# Patient Record
Sex: FEMALE | Race: BLACK OR AFRICAN AMERICAN | NOT HISPANIC OR LATINO | Employment: PART TIME | URBAN - METROPOLITAN AREA
[De-identification: names, ages, dates, MRNs, and addresses within clinical notes are randomized per-mention and may not be internally consistent; named-entity substitution may affect disease eponyms.]

---

## 2019-10-21 ENCOUNTER — HOSPITAL ENCOUNTER (EMERGENCY)
Facility: HOSPITAL | Age: 24
Discharge: HOME | End: 2019-10-21
Attending: EMERGENCY MEDICINE | Admitting: EMERGENCY MEDICINE

## 2019-10-21 VITALS
OXYGEN SATURATION: 95 % | RESPIRATION RATE: 16 BRPM | DIASTOLIC BLOOD PRESSURE: 96 MMHG | SYSTOLIC BLOOD PRESSURE: 114 MMHG | HEIGHT: 63 IN | HEART RATE: 101 BPM | TEMPERATURE: 98.9 F

## 2019-10-21 DIAGNOSIS — N75.0 BARTHOLIN CYST: Primary | ICD-10-CM

## 2019-10-21 DIAGNOSIS — Z72.51 HIGH RISK HETEROSEXUAL BEHAVIOR: ICD-10-CM

## 2019-10-21 LAB
B-HCG UR QL: NEGATIVE
CLUE CELLS SPEC QL WET PREP: NORMAL
HYDATID CYST SPEC WET PREP: NORMAL
T VAGINALIS SPEC QL WET PREP: NORMAL
WBC SPEC QL WET PREP: NORMAL
YEAST GENITAL QL WET PREP: NORMAL

## 2019-10-21 PROCEDURE — 87210 SMEAR WET MOUNT SALINE/INK: CPT | Performed by: NURSE PRACTITIONER

## 2019-10-21 PROCEDURE — 99283 EMERGENCY DEPT VISIT LOW MDM: CPT

## 2019-10-21 PROCEDURE — 87070 CULTURE OTHR SPECIMN AEROBIC: CPT | Performed by: EMERGENCY MEDICINE

## 2019-10-21 PROCEDURE — 87205 SMEAR GRAM STAIN: CPT | Performed by: EMERGENCY MEDICINE

## 2019-10-21 PROCEDURE — 87491 CHLMYD TRACH DNA AMP PROBE: CPT | Performed by: NURSE PRACTITIONER

## 2019-10-21 PROCEDURE — 25010000002 CEFTRIAXONE PER 250 MG: Performed by: NURSE PRACTITIONER

## 2019-10-21 PROCEDURE — 87591 N.GONORRHOEAE DNA AMP PROB: CPT | Performed by: NURSE PRACTITIONER

## 2019-10-21 PROCEDURE — 96372 THER/PROPH/DIAG INJ SC/IM: CPT

## 2019-10-21 PROCEDURE — 81025 URINE PREGNANCY TEST: CPT | Performed by: NURSE PRACTITIONER

## 2019-10-21 RX ORDER — CEPHALEXIN 500 MG/1
500 CAPSULE ORAL 3 TIMES DAILY
Qty: 40 CAPSULE | Refills: 0 | OUTPATIENT
Start: 2019-10-21 | End: 2019-12-04

## 2019-10-21 RX ORDER — LIDOCAINE HYDROCHLORIDE 10 MG/ML
0.9 INJECTION, SOLUTION EPIDURAL; INFILTRATION; INTRACAUDAL; PERINEURAL ONCE
Status: COMPLETED | OUTPATIENT
Start: 2019-10-21 | End: 2019-10-21

## 2019-10-21 RX ORDER — CEFTRIAXONE SODIUM 250 MG/1
250 INJECTION, POWDER, FOR SOLUTION INTRAMUSCULAR; INTRAVENOUS ONCE
Status: COMPLETED | OUTPATIENT
Start: 2019-10-21 | End: 2019-10-21

## 2019-10-21 RX ORDER — HYDROCODONE BITARTRATE AND ACETAMINOPHEN 5; 325 MG/1; MG/1
1 TABLET ORAL ONCE
Status: COMPLETED | OUTPATIENT
Start: 2019-10-21 | End: 2019-10-21

## 2019-10-21 RX ORDER — ONDANSETRON 4 MG/1
4 TABLET, ORALLY DISINTEGRATING ORAL ONCE
Status: COMPLETED | OUTPATIENT
Start: 2019-10-21 | End: 2019-10-21

## 2019-10-21 RX ADMIN — CEFTRIAXONE SODIUM 250 MG: 250 INJECTION, POWDER, FOR SOLUTION INTRAMUSCULAR; INTRAVENOUS at 17:29

## 2019-10-21 RX ADMIN — ONDANSETRON 4 MG: 4 TABLET, ORALLY DISINTEGRATING ORAL at 16:57

## 2019-10-21 RX ADMIN — LIDOCAINE HYDROCHLORIDE 0.9 ML: 10 INJECTION, SOLUTION EPIDURAL; INFILTRATION; INTRACAUDAL; PERINEURAL at 17:29

## 2019-10-21 RX ADMIN — HYDROCODONE BITARTRATE AND ACETAMINOPHEN 1 TABLET: 5; 325 TABLET ORAL at 16:57

## 2019-10-21 NOTE — ED PROVIDER NOTES
MD ATTESTATION NOTE    The DARCIE and I have discussed this patient's history, physical exam, and treatment plan.  I have reviewed the documentation and personally had a face to face interaction with the patient. I affirm the documentation and agree with the treatment and plan.  The attached note describes my personal findings.      History  24-year-old female presents with drainage from the vaginal area.  She is concerned that she may be pregnant.    Physical Exam  Vital Signs reviewed  Alert, Well Appearing in NAD  Abdomen there is no significant tenderness to palpation.    Disposition  I discussed patient's evaluation with NP Vee Yip.  She feels that there is a draining Bartholin cyst.  She will express purulence from this wound but does not feel that it needs surgical drainage at this point.  We will check some testing for STDs and for pregnancy.     Ephraim Garcia MD  10/21/19 8663

## 2019-10-21 NOTE — ED PROVIDER NOTES
"EMERGENCY DEPARTMENT ENCOUNTER    Room Number:  07/07  Date seen:  10/21/2019  Time seen: 4:30 PM  PCP: Provider, No Known    HPI:  Chief complaint:Vaginal   Context:Haley Hutchins is a 24 y.o. female who presents to the ED with c/o intermittent vaginal bleeding that was noticed one week ago. Pt also c/o malodorous vaginal discharge and a vaginal lesion with pain. The pt states she believed to have a yeast infection around 10/4/19 for which she treated with Monistat. Shortly after, she developed a \"lump\" at the vaginal opening with intermittent drainage seen. She noticed abnormal vaginal bleeding described as \"spotting\" one week ago, that has persisted. Due to continuous bleeding and pain, she decided to come to the ED for further evaluation. The pt reports possible STD exposure, as well as the potential to be pregnant, as she has had unprotected vaginal intercourse for the past month.     Onset: gradual  Location:vagina  Radiation: none stated  Duration: one week  Timing: intermittent  Character: \"spotting\"  Aggravating Factors: none stated  Alleviating Factors: none stated  Severity: moderate    ALLERGIES  Shellfish-derived products    PAST MEDICAL HISTORY  Active Ambulatory Problems     Diagnosis Date Noted   • No Active Ambulatory Problems     Resolved Ambulatory Problems     Diagnosis Date Noted   • No Resolved Ambulatory Problems     No Additional Past Medical History       PAST SURGICAL HISTORY  No past surgical history on file.    FAMILY HISTORY  No family history on file.    SOCIAL HISTORY  Social History     Socioeconomic History   • Marital status: Single     Spouse name: Not on file   • Number of children: Not on file   • Years of education: Not on file   • Highest education level: Not on file       REVIEW OF SYSTEMS  Review of Systems   Constitutional: Negative for fatigue and fever.   HENT: Negative for congestion and sore throat.    Eyes: Negative for visual disturbance.   Respiratory: Negative for " cough, shortness of breath and wheezing.    Cardiovascular: Negative for chest pain.   Gastrointestinal: Negative for abdominal pain, diarrhea, nausea and vomiting.   Genitourinary: Positive for genital sores (lesion), vaginal bleeding, vaginal discharge and vaginal pain. Negative for dysuria, frequency and urgency.   Musculoskeletal: Negative for arthralgias, back pain and myalgias.   Skin: Negative for rash.   Neurological: Negative for dizziness, syncope, weakness and headaches.   Psychiatric/Behavioral: Negative for confusion and self-injury. The patient is not nervous/anxious.        PHYSICAL EXAM  ED Triage Vitals [10/21/19 1607]   Temp Heart Rate Resp BP SpO2   98.9 °F (37.2 °C) 101 16 -- 95 %      Temp src Heart Rate Source Patient Position BP Location FiO2 (%)   Tympanic Monitor -- -- --     Physical Exam   Constitutional: She is oriented to person, place, and time and well-developed, well-nourished, and in no distress.   HENT:   Head: Normocephalic and atraumatic.   Nose: Nose normal.   Mouth/Throat: Uvula is midline, oropharynx is clear and moist and mucous membranes are normal.   Eyes: Pupils are equal, round, and reactive to light.   Neck: Normal range of motion. Neck supple.   Cardiovascular: Normal rate, regular rhythm and normal heart sounds.   Pulmonary/Chest: Effort normal and breath sounds normal.   Abdominal: Soft. Normal appearance and bowel sounds are normal. There is no tenderness.   Genitourinary: Vagina exhibits lesion (draining to the L labia near the introitus that is tender without enduration). Vagina exhibits no exudate. Yellow-green and vaginal discharge (purulent) found.   Neurological: She is alert and oriented to person, place, and time. GCS score is 15.   Skin: Skin is warm, dry and intact.   Psychiatric: Mood, memory, affect and judgment normal.   Nursing note and vitals reviewed.      LAB RESULTS  Recent Results (from the past 24 hour(s))   Wet Prep, Genital - Swab, Vagina     Collection Time: 10/21/19  4:39 PM   Result Value Ref Range    YEAST No yeast seen No yeast seen    HYPHAL ELEMENTS No Hyphal elements seen No Hyphal elements seen    WBC'S No WBC's seen No WBC's seen    Clue Cells, Wet Prep No Clue cells seen No Clue cells seen    Trichomonas, Wet Prep No Trichomonas seen No Trichomonas seen   Wound Culture - Wound, Vagina    Collection Time: 10/21/19  4:40 PM   Result Value Ref Range    Gram Stain No WBCs seen     Gram Stain Few (2+) Gram positive cocci in pairs     Gram Stain Rare (1+) Gram positive bacilli     Gram Stain Few (2+) Gram negative bacilli    Pregnancy, Urine - Urine, Clean Catch    Collection Time: 10/21/19  4:57 PM   Result Value Ref Range    HCG, Urine QL Negative Negative       I ordered the above labs and reviewed the results      MEDICATIONS GIVEN IN ER  Medications   HYDROcodone-acetaminophen (NORCO) 5-325 MG per tablet 1 tablet (1 tablet Oral Given 10/21/19 1657)   ondansetron ODT (ZOFRAN-ODT) disintegrating tablet 4 mg (4 mg Oral Given 10/21/19 1657)   cefTRIAXone (ROCEPHIN) injection 250 mg (250 mg Intramuscular Given 10/21/19 1729)   lidocaine PF 1% (XYLOCAINE) injection 0.9 mL (0.9 mL Injection Given 10/21/19 1729)       PROGRESS AND CONSULTS    Progress Notes:       1632 Pregnancy urine ordered. Wet prep ordered. Chlamydia trachomatis ordered.     1638 Norco ordered. Zofran ordered.     1640 Wound culture ordered.     1657 Reviewed pt's history and workup with Dr. Garcia.  After a bedside evaluation; Dr Garcia agrees with the plan of care    1710 Rechecked pt. Pt is resting comfortably with BP of 114/96. Discussed w/pt results show no yeast infection or bacterial vaginosis. Pt will be updated with additional labs when resulted. Plan is to d/c pt with f/u to OB/GYN as needed. Pt was instructed to take warm baths and return to the ED with worsening sx's. Pt understands and agrees with the plan. All questions were answered.     1713 Rocephin ordered.  "Xylocaine ordered.     Disposition vitals:  /96   Pulse 101   Temp 98.9 °F (37.2 °C) (Tympanic)   Resp 16   Ht 160 cm (63\")   LMP 10/10/2019 (Exact Date)   SpO2 95%       DIAGNOSIS  Final diagnoses:   Bartholin cyst   High risk heterosexual behavior       FOLLOW UP   PATIENT LIAISON [place] [address]  [phone]  Call       Edwar Aguiar MD  [address] [facility] [address]  [place] [address]  [phone]    Call         RX     Medication List      No changes were made to your prescriptions during this visit.       Documentation assistance provided by shahla Yanez for KIRSTIN Smith.  Information recorded by the scribe was done at my direction and has been verified and validated by me.       Beau Hauser  10/21/19 1806       Vee Yip APRN  10/21/19 2007    "

## 2019-10-21 NOTE — DISCHARGE INSTRUCTIONS
Home to rest  Warm sitz baths as discussed   Warm washcloth to the area 4 times per day 20 min at a time to promote drainage  If continue pain, follow up with gynecology Dr Aguiar  You were treated for a possible gonorrhea infection today. The results are not available. They will be available in 24-48 hours. If +, your partner will also need treatment. You cannot have sex until after he has also been treated to prevent re exposing yourself to the infection and causing another infection.   Return if worse or new concerns   Continue care with your primary care physician and have your blood pressure regularly checked and managed. Normal blood pressure is 120/80.

## 2019-10-23 LAB
BACTERIA SPEC AEROBE CULT: NORMAL
C TRACH RRNA SPEC DONR QL NAA+PROBE: NEGATIVE
GRAM STN SPEC: NORMAL
N GONORRHOEA DNA SPEC QL NAA+PROBE: NEGATIVE

## 2019-12-04 ENCOUNTER — APPOINTMENT (OUTPATIENT)
Dept: ULTRASOUND IMAGING | Facility: HOSPITAL | Age: 24
End: 2019-12-04

## 2019-12-04 ENCOUNTER — HOSPITAL ENCOUNTER (EMERGENCY)
Facility: HOSPITAL | Age: 24
Discharge: HOME | End: 2019-12-04
Attending: EMERGENCY MEDICINE | Admitting: EMERGENCY MEDICINE

## 2019-12-04 VITALS
RESPIRATION RATE: 16 BRPM | BODY MASS INDEX: 51.03 KG/M2 | DIASTOLIC BLOOD PRESSURE: 73 MMHG | WEIGHT: 288 LBS | SYSTOLIC BLOOD PRESSURE: 137 MMHG | OXYGEN SATURATION: 98 % | HEART RATE: 77 BPM | HEIGHT: 63 IN | TEMPERATURE: 97.7 F

## 2019-12-04 DIAGNOSIS — N93.9 VAGINAL BLEEDING: Primary | ICD-10-CM

## 2019-12-04 DIAGNOSIS — O20.0 THREATENED MISCARRIAGE: ICD-10-CM

## 2019-12-04 LAB
ABO GROUP BLD: NORMAL
ANION GAP SERPL CALCULATED.3IONS-SCNC: 11.5 MMOL/L (ref 5–15)
BACTERIA UR QL AUTO: ABNORMAL /HPF
BACTERIA UR QL AUTO: ABNORMAL /HPF
BASOPHILS # BLD AUTO: 0.04 10*3/MM3 (ref 0–0.2)
BASOPHILS NFR BLD AUTO: 0.3 % (ref 0–1.5)
BILIRUB UR QL STRIP: NEGATIVE
BILIRUB UR QL STRIP: NEGATIVE
BUN BLD-MCNC: 9 MG/DL (ref 6–20)
BUN/CREAT SERPL: 15.3 (ref 7–25)
CALCIUM SPEC-SCNC: 8.8 MG/DL (ref 8.6–10.5)
CHLORIDE SERPL-SCNC: 104 MMOL/L (ref 98–107)
CLARITY UR: ABNORMAL
CLARITY UR: CLEAR
CO2 SERPL-SCNC: 26.5 MMOL/L (ref 22–29)
COLOR UR: ABNORMAL
COLOR UR: YELLOW
CREAT BLD-MCNC: 0.59 MG/DL (ref 0.57–1)
DEPRECATED RDW RBC AUTO: 43.6 FL (ref 37–54)
EOSINOPHIL # BLD AUTO: 0.18 10*3/MM3 (ref 0–0.4)
EOSINOPHIL NFR BLD AUTO: 1.5 % (ref 0.3–6.2)
ERYTHROCYTE [DISTWIDTH] IN BLOOD BY AUTOMATED COUNT: 14.4 % (ref 12.3–15.4)
GFR SERPL CREATININE-BSD FRML MDRD: >150 ML/MIN/1.73
GLUCOSE BLD-MCNC: 84 MG/DL (ref 65–99)
GLUCOSE UR STRIP-MCNC: NEGATIVE MG/DL
GLUCOSE UR STRIP-MCNC: NEGATIVE MG/DL
HCG INTACT+B SERPL-ACNC: 88.1 MIU/ML
HCT VFR BLD AUTO: 38 % (ref 34–46.6)
HGB BLD-MCNC: 12.7 G/DL (ref 12–15.9)
HGB UR QL STRIP.AUTO: ABNORMAL
HGB UR QL STRIP.AUTO: ABNORMAL
HYALINE CASTS UR QL AUTO: ABNORMAL /LPF
HYALINE CASTS UR QL AUTO: ABNORMAL /LPF
IMM GRANULOCYTES # BLD AUTO: 0.07 10*3/MM3 (ref 0–0.05)
IMM GRANULOCYTES NFR BLD AUTO: 0.6 % (ref 0–0.5)
KETONES UR QL STRIP: NEGATIVE
KETONES UR QL STRIP: NEGATIVE
LEUKOCYTE ESTERASE UR QL STRIP.AUTO: ABNORMAL
LEUKOCYTE ESTERASE UR QL STRIP.AUTO: NEGATIVE
LYMPHOCYTES # BLD AUTO: 4.09 10*3/MM3 (ref 0.7–3.1)
LYMPHOCYTES NFR BLD AUTO: 33 % (ref 19.6–45.3)
MCH RBC QN AUTO: 27.9 PG (ref 26.6–33)
MCHC RBC AUTO-ENTMCNC: 33.4 G/DL (ref 31.5–35.7)
MCV RBC AUTO: 83.3 FL (ref 79–97)
MONOCYTES # BLD AUTO: 0.7 10*3/MM3 (ref 0.1–0.9)
MONOCYTES NFR BLD AUTO: 5.6 % (ref 5–12)
NEUTROPHILS # BLD AUTO: 7.32 10*3/MM3 (ref 1.7–7)
NEUTROPHILS NFR BLD AUTO: 59 % (ref 42.7–76)
NITRITE UR QL STRIP: NEGATIVE
NITRITE UR QL STRIP: NEGATIVE
NRBC BLD AUTO-RTO: 0 /100 WBC (ref 0–0.2)
PH UR STRIP.AUTO: 5.5 [PH] (ref 5–8)
PH UR STRIP.AUTO: 7 [PH] (ref 5–8)
PLATELET # BLD AUTO: 415 10*3/MM3 (ref 140–450)
PMV BLD AUTO: 9.4 FL (ref 6–12)
POTASSIUM BLD-SCNC: 3.9 MMOL/L (ref 3.5–5.2)
PROT UR QL STRIP: ABNORMAL
PROT UR QL STRIP: NEGATIVE
RBC # BLD AUTO: 4.56 10*6/MM3 (ref 3.77–5.28)
RBC # UR: ABNORMAL /HPF
RBC # UR: ABNORMAL /HPF
REF LAB TEST METHOD: ABNORMAL
REF LAB TEST METHOD: ABNORMAL
RH BLD: POSITIVE
SODIUM BLD-SCNC: 142 MMOL/L (ref 136–145)
SP GR UR STRIP: 1.01 (ref 1–1.03)
SP GR UR STRIP: 1.01 (ref 1–1.03)
SQUAMOUS #/AREA URNS HPF: ABNORMAL /HPF
SQUAMOUS #/AREA URNS HPF: ABNORMAL /HPF
UROBILINOGEN UR QL STRIP: ABNORMAL
UROBILINOGEN UR QL STRIP: ABNORMAL
WBC NRBC COR # BLD: 12.4 10*3/MM3 (ref 3.4–10.8)
WBC UR QL AUTO: ABNORMAL /HPF
WBC UR QL AUTO: ABNORMAL /HPF

## 2019-12-04 PROCEDURE — 80048 BASIC METABOLIC PNL TOTAL CA: CPT | Performed by: NURSE PRACTITIONER

## 2019-12-04 PROCEDURE — 81001 URINALYSIS AUTO W/SCOPE: CPT | Performed by: NURSE PRACTITIONER

## 2019-12-04 PROCEDURE — P9612 CATHETERIZE FOR URINE SPEC: HCPCS

## 2019-12-04 PROCEDURE — 76817 TRANSVAGINAL US OBSTETRIC: CPT

## 2019-12-04 PROCEDURE — 85025 COMPLETE CBC W/AUTO DIFF WBC: CPT | Performed by: NURSE PRACTITIONER

## 2019-12-04 PROCEDURE — 99284 EMERGENCY DEPT VISIT MOD MDM: CPT

## 2019-12-04 PROCEDURE — 86900 BLOOD TYPING SEROLOGIC ABO: CPT | Performed by: NURSE PRACTITIONER

## 2019-12-04 PROCEDURE — 86901 BLOOD TYPING SEROLOGIC RH(D): CPT | Performed by: NURSE PRACTITIONER

## 2019-12-04 PROCEDURE — 76815 OB US LIMITED FETUS(S): CPT

## 2019-12-04 PROCEDURE — 84702 CHORIONIC GONADOTROPIN TEST: CPT | Performed by: NURSE PRACTITIONER

## 2019-12-04 PROCEDURE — 93976 VASCULAR STUDY: CPT

## 2019-12-04 NOTE — ED PROVIDER NOTES
MD ATTESTATION NOTE    The DARCIE and I have discussed this patient's history, physical exam, and treatment plan.  I have reviewed the documentation and personally had a face to face interaction with the patient. I affirm the documentation and agree with the treatment and plan.  The attached note describes my personal findings.      History  24-year-old female presents with heavy vaginal bleeding.  Her last menstrual period was 11/23.  She had 2+ pregnancy test yesterday at home.    Physical Exam  Vital Signs reviewed  Alert, Well Appearing in NAD  Respirations unlabored  Abdomen soft nontender nondistended    Disposition  We will check routine labs including hCG quantitative.  Will get OB ultrasound if hCG is positive.     Ephraim Garcia MD  12/04/19 0873

## 2019-12-04 NOTE — DISCHARGE INSTRUCTIONS
Vaginal rest  Increase fluids  Follow-up with OB/GYN, call today to schedule an appointment.  You will need a repeat quant in 48 hours  Return to the ER for fever, chills, abdominal pain, worsening bleeding or any new or worsening symptoms

## 2019-12-04 NOTE — ED PROVIDER NOTES
EMERGENCY DEPARTMENT ENCOUNTER    CHIEF COMPLAINT  Chief Complaint: vaginal bleeding  History given by:Pt  History limited by:none  Time Seen: 0754  Room Number: 18/18  PMD: Provider, No Known      HPI:  Pt is a 24 y.o. female who presents with bright red vaginal bleeding that began yesterday. Pt states her vaginal bleeding started out as spotting but worsened and became heavy at 6 am this morning. She denies any clots. She states she took a home pregnancy test at 2 pm yesterday and again at 430 that were positive. Patient denies urinary Sx or abnormal vaginal discharge. Pt is currently sexually active without any contraception. Her LMP was 11/19.   Past Medical History- she denies any significant medical Hx.       PAST MEDICAL HISTORY  Active Ambulatory Problems     Diagnosis Date Noted   • No Active Ambulatory Problems     Resolved Ambulatory Problems     Diagnosis Date Noted   • No Resolved Ambulatory Problems     No Additional Past Medical History       PAST SURGICAL HISTORY  History reviewed. No pertinent surgical history.    FAMILY HISTORY  History reviewed. No pertinent family history.    SOCIAL HISTORY  Social History     Socioeconomic History   • Marital status: Single     Spouse name: Not on file   • Number of children: Not on file   • Years of education: Not on file   • Highest education level: Not on file         ALLERGIES  Shellfish-derived products    REVIEW OF SYSTEMS  Review of Systems   Constitutional: Negative for chills and fever.   HENT: Negative for sore throat.    Respiratory: Negative for shortness of breath.    Cardiovascular: Negative for chest pain.   Gastrointestinal: Negative for nausea and vomiting.   Genitourinary: Positive for vaginal bleeding. Negative for dysuria and vaginal discharge.   Musculoskeletal: Negative for back pain.   Skin: Negative for rash.   Neurological: Negative for dizziness.   Psychiatric/Behavioral: The patient is not nervous/anxious.        PHYSICAL EXAM  ED  Triage Vitals   Temp Heart Rate Resp BP SpO2   12/04/19 0715 12/04/19 0715 12/04/19 0715 12/04/19 0723 12/04/19 0715   97.7 °F (36.5 °C) 99 16 154/68 99 %       Physical Exam   Constitutional: She is well-developed, well-nourished, and in no distress. No distress.   HENT:   Head: Normocephalic.   Mouth/Throat: Mucous membranes are normal.   Eyes: EOM are normal. No scleral icterus.   Neck: Normal range of motion.   Cardiovascular: Normal rate, regular rhythm and normal heart sounds.   No murmur heard.  Pulmonary/Chest: Effort normal and breath sounds normal. No respiratory distress. She has no wheezes. She has no rales.   Abdominal: Soft. Bowel sounds are normal. She exhibits no distension. There is no tenderness.   Genitourinary: Uterus normal, cervix normal, right adnexa normal and left adnexa normal.   Genitourinary Comments: Small amount of blood noted in vaginal vault.  Cervical Os closed   ER Tech at bedside during exam   Musculoskeletal: Normal range of motion.   Neurological: She is alert.   Skin: Skin is warm and dry.   Psychiatric: Mood and affect normal.   Nursing note and vitals reviewed.      LAB RESULTS  Recent Results (from the past 24 hour(s))   Basic Metabolic Panel    Collection Time: 12/04/19  8:29 AM   Result Value Ref Range    Glucose 84 65 - 99 mg/dL    BUN 9 6 - 20 mg/dL    Creatinine 0.59 0.57 - 1.00 mg/dL    Sodium 142 136 - 145 mmol/L    Potassium 3.9 3.5 - 5.2 mmol/L    Chloride 104 98 - 107 mmol/L    CO2 26.5 22.0 - 29.0 mmol/L    Calcium 8.8 8.6 - 10.5 mg/dL    eGFR  African Amer >150 >60 mL/min/1.73    BUN/Creatinine Ratio 15.3 7.0 - 25.0    Anion Gap 11.5 5.0 - 15.0 mmol/L   hCG, Quantitative, Pregnancy    Collection Time: 12/04/19  8:29 AM   Result Value Ref Range    HCG Quantitative 88.10 mIU/mL   ABO / Rh    Collection Time: 12/04/19  8:29 AM   Result Value Ref Range    ABO Type O     RH type Positive    CBC Auto Differential    Collection Time: 12/04/19  8:29 AM   Result Value Ref  Range    WBC 12.40 (H) 3.40 - 10.80 10*3/mm3    RBC 4.56 3.77 - 5.28 10*6/mm3    Hemoglobin 12.7 12.0 - 15.9 g/dL    Hematocrit 38.0 34.0 - 46.6 %    MCV 83.3 79.0 - 97.0 fL    MCH 27.9 26.6 - 33.0 pg    MCHC 33.4 31.5 - 35.7 g/dL    RDW 14.4 12.3 - 15.4 %    RDW-SD 43.6 37.0 - 54.0 fl    MPV 9.4 6.0 - 12.0 fL    Platelets 415 140 - 450 10*3/mm3    Neutrophil % 59.0 42.7 - 76.0 %    Lymphocyte % 33.0 19.6 - 45.3 %    Monocyte % 5.6 5.0 - 12.0 %    Eosinophil % 1.5 0.3 - 6.2 %    Basophil % 0.3 0.0 - 1.5 %    Immature Grans % 0.6 (H) 0.0 - 0.5 %    Neutrophils, Absolute 7.32 (H) 1.70 - 7.00 10*3/mm3    Lymphocytes, Absolute 4.09 (H) 0.70 - 3.10 10*3/mm3    Monocytes, Absolute 0.70 0.10 - 0.90 10*3/mm3    Eosinophils, Absolute 0.18 0.00 - 0.40 10*3/mm3    Basophils, Absolute 0.04 0.00 - 0.20 10*3/mm3    Immature Grans, Absolute 0.07 (H) 0.00 - 0.05 10*3/mm3    nRBC 0.0 0.0 - 0.2 /100 WBC   Urinalysis With Microscopic If Indicated (No Culture) - Urine, Catheter    Collection Time: 12/04/19  8:30 AM   Result Value Ref Range    Color, UA Red (A) Yellow, Straw    Appearance, UA Cloudy (A) Clear    pH, UA 5.5 5.0 - 8.0    Specific Gravity, UA 1.015 1.005 - 1.030    Glucose, UA Negative Negative    Ketones, UA Negative Negative    Bilirubin, UA Negative Negative    Blood, UA Large (3+) (A) Negative    Protein, UA Trace (A) Negative    Leuk Esterase, UA Moderate (2+) (A) Negative    Nitrite, UA Negative Negative    Urobilinogen, UA 0.2 E.U./dL 0.2 - 1.0 E.U./dL   Urinalysis, Microscopic Only - Urine, Catheter    Collection Time: 12/04/19  8:30 AM   Result Value Ref Range    RBC, UA Too Numerous to Count (A) None Seen, 0-2 /HPF    WBC, UA 3-5 (A) None Seen, 0-2 /HPF    Bacteria, UA 1+ (A) None Seen /HPF    Squamous Epithelial Cells, UA 0-2 None Seen, 0-2 /HPF    Hyaline Casts, UA None Seen None Seen /LPF    Methodology Manual Light Microscopy    Urinalysis With Microscopic If Indicated (No Culture) - Urine, Catheter     Collection Time: 12/04/19 11:19 AM   Result Value Ref Range    Color, UA Yellow Yellow, Straw    Appearance, UA Clear Clear    pH, UA 7.0 5.0 - 8.0    Specific Gravity, UA 1.014 1.005 - 1.030    Glucose, UA Negative Negative    Ketones, UA Negative Negative    Bilirubin, UA Negative Negative    Blood, UA Moderate (2+) (A) Negative    Protein, UA Negative Negative    Leuk Esterase, UA Negative Negative    Nitrite, UA Negative Negative    Urobilinogen, UA 0.2 E.U./dL 0.2 - 1.0 E.U./dL   Urinalysis, Microscopic Only - Urine, Catheter    Collection Time: 12/04/19 11:19 AM   Result Value Ref Range    RBC, UA 21-30 (A) None Seen, 0-2 /HPF    WBC, UA 0-2 None Seen, 0-2 /HPF    Bacteria, UA None Seen None Seen /HPF    Squamous Epithelial Cells, UA 0-2 None Seen, 0-2 /HPF    Hyaline Casts, UA None Seen None Seen /LPF    Methodology Automated Microscopy        I ordered the above labs and reviewed the results    RADIOLOGY  US Ob Limited 1 + Fetuses   Final Result   Multiple transabdominal and transvaginal images were obtained. The  uterus and both ovaries are well visualized and appear within normal  limits. No intrauterine gestational sac is identified. There is no free  fluid in the cul-de-sac. Correlation with clinical findings and serial  beta hCG measurements is recommended.   US Ob Transvaginal    (Results Pending)   US Testicular or Ovarian Vascular Limited    (Results Pending)   unremarkable- no sign of pregnancy    I ordered the above noted radiological studies and reviewed the images on the PACS system.          PROGRESS AND CONSULTS       0811 Reviewed pt's history and workup with Dr. Garcia.  At bedside evaluation, they agree with the plan of care.    1143 Cath urine reviewed which shows no WBCs or bacteria.     1144 Rechecked pt. Pt is resting comfortably in NAD. Notified pt of lab work results, including low HCG quant of 88, and US that shows no signs of pregnancy. Discussed the plan to discharge the pt home. I  "instructed the pt to f/u with OBGYN and monitor her quant closely. Discussed vaginal rest and fluids. Pt understands and agrees with the plan, all questions answered.      Reviewed implications of results, diagnosis, meds, responsibility to follow up, warning signs and symptoms of possible worsening, potential complications and reasons to return to ER with patient.  Discussed all results and noted any abnormalities with patient.  Discussed absolute need to recheck abnormalities with OBGYN.    Discussed plan for discharge, as there is no emergent indication for admission.  Pt is agreeable and understands need for follow up and repeat testing.  Pt is aware that discharge does not mean that nothing is wrong but it indicates no emergency is present.  Pt is discharged with instructions to follow up with primary care doctor to have their blood pressure rechecked.       DIAGNOSIS  Final diagnoses:   Vaginal bleeding   Threatened miscarriage       FOLLOW UP   Claire Foss MD  [address] [place] [id number] [phone]    Call today        COURSE & MEDICAL DECISION MAKING  Pertinent Labs and Imaging studies that were ordered and reviewed are noted above.  Results were reviewed/discussed with the patient and they were also made aware of online assess.   Pt also made aware that some labs, such as cultures, will not be resulted during ER visit and follow up with PMD is necessary.     MEDICATIONS GIVEN IN ER  Medications - No data to display    /73 (BP Location: Left arm, Patient Position: Lying)   Pulse 77   Temp 97.7 °F (36.5 °C) (Tympanic)   Resp 16   Ht 160 cm (63\")   Wt 131 kg (288 lb)   LMP 11/19/2019 (Within Days)   SpO2 98%   BMI 51.02 kg/m²       I personally reviewed the past medical history, past surgical history, social history, family history, current medications and allergies as they appear in this chart.  The scribe's note accurately reflects the work and decisions made by me. "     Documentation assistance provided by shahla Hernandez for ANA MARIA Mckinley on 12/4/2019 at 12:16 PM. Information recorded by the scribe was done at my direction and has been verified and validated by me.        Edwar Hernandez  12/04/19 1217       Roya Gonzales, KIRSTIN  12/04/19 1246

## 2019-12-04 NOTE — ED NOTES
"Pt states \"I took two pregnancy tests yesterday and they were positive but this morning I started having heavy vaginal bleeding.\" Pt reports her last menstrual cycle ended 11/19/2019     Olga Moses, RN  12/04/19 0748    "

## 2019-12-04 NOTE — ED NOTES
Pt states she has taken two pregnancy tests and both have indicated positive. States she started having heavy vaginal bleeding this morning and is bright red in color. Last known menstrual period ended around 11/19/19. Denies clots in pads and states she is changing pads [id number].     Javy Freeman, RN  12/04/19 4629

## 2020-01-06 ENCOUNTER — APPOINTMENT (OUTPATIENT)
Dept: GENERAL RADIOLOGY | Facility: HOSPITAL | Age: 25
End: 2020-01-06

## 2020-01-06 ENCOUNTER — HOSPITAL ENCOUNTER (EMERGENCY)
Facility: HOSPITAL | Age: 25
Discharge: HOME | End: 2020-01-06
Attending: EMERGENCY MEDICINE | Admitting: EMERGENCY MEDICINE

## 2020-01-06 VITALS
HEART RATE: 107 BPM | BODY MASS INDEX: 51.02 KG/M2 | SYSTOLIC BLOOD PRESSURE: 131 MMHG | RESPIRATION RATE: 18 BRPM | DIASTOLIC BLOOD PRESSURE: 70 MMHG | TEMPERATURE: 99.9 F | OXYGEN SATURATION: 96 % | HEIGHT: 63 IN

## 2020-01-06 DIAGNOSIS — R07.89 CHEST WALL PAIN: Primary | ICD-10-CM

## 2020-01-06 DIAGNOSIS — J10.1 INFLUENZA B: ICD-10-CM

## 2020-01-06 LAB
FLUAV AG NPH QL: NEGATIVE
FLUBV AG NPH QL IA: POSITIVE

## 2020-01-06 PROCEDURE — 71046 X-RAY EXAM CHEST 2 VIEWS: CPT

## 2020-01-06 PROCEDURE — 87804 INFLUENZA ASSAY W/OPTIC: CPT | Performed by: EMERGENCY MEDICINE

## 2020-01-06 PROCEDURE — 94799 UNLISTED PULMONARY SVC/PX: CPT

## 2020-01-06 PROCEDURE — 99284 EMERGENCY DEPT VISIT MOD MDM: CPT

## 2020-01-06 PROCEDURE — 94640 AIRWAY INHALATION TREATMENT: CPT

## 2020-01-06 RX ORDER — ACETAMINOPHEN 500 MG
1000 TABLET ORAL ONCE
Status: COMPLETED | OUTPATIENT
Start: 2020-01-06 | End: 2020-01-06

## 2020-01-06 RX ORDER — ALBUTEROL SULFATE 90 UG/1
2 AEROSOL, METERED RESPIRATORY (INHALATION) EVERY 4 HOURS PRN
Qty: 1 INHALER | Refills: 0 | Status: SHIPPED | OUTPATIENT
Start: 2020-01-06

## 2020-01-06 RX ORDER — IBUPROFEN 600 MG/1
600 TABLET ORAL EVERY 6 HOURS PRN
Qty: 15 TABLET | Refills: 0 | Status: SHIPPED | OUTPATIENT
Start: 2020-01-06

## 2020-01-06 RX ORDER — ALBUTEROL SULFATE 2.5 MG/3ML
2.5 SOLUTION RESPIRATORY (INHALATION) ONCE
Status: COMPLETED | OUTPATIENT
Start: 2020-01-06 | End: 2020-01-06

## 2020-01-06 RX ADMIN — ALBUTEROL SULFATE 2.5 MG: 2.5 SOLUTION RESPIRATORY (INHALATION) at 22:17

## 2020-01-06 RX ADMIN — ACETAMINOPHEN 1000 MG: 500 TABLET, FILM COATED ORAL at 22:09

## 2020-01-07 NOTE — ED PROVIDER NOTES
EMERGENCY DEPARTMENT ENCOUNTER    CHIEF COMPLAINT  Chief Complaint: cough  History given by: patient  History limited by: none  Room Number: 01/01  PMD: Provider, No Known      HPI:  Pt is a 24 y.o. female who presents complaining of productive cough with yellow/clear sputum for one week. Pt reports sx were gradual in onset. Pt also complains of chest pain with deep inhalation and wheezing improved with dayquil. Pt reports boyfriend had similar sx for last 3 weeks. Pt denies influenza vaccine this year.     Duration:  One week  Onset: gradual  Quality: productive, yellow/clear sputum  Intensity/Severity: moderate  Associated Symptoms: chest pain, wheezing  Treatment before arrival: dayquil    PAST MEDICAL HISTORY  Active Ambulatory Problems     Diagnosis Date Noted   • No Active Ambulatory Problems     Resolved Ambulatory Problems     Diagnosis Date Noted   • No Resolved Ambulatory Problems     No Additional Past Medical History       PAST SURGICAL HISTORY  No past surgical history on file.    FAMILY HISTORY  No family history on file.    SOCIAL HISTORY  Social History     Socioeconomic History   • Marital status: Single     Spouse name: Not on file   • Number of children: Not on file   • Years of education: Not on file   • Highest education level: Not on file       ALLERGIES  Shellfish-derived products    REVIEW OF SYSTEMS  Review of Systems   Constitutional: Negative for fever.   HENT: Negative for sore throat.    Eyes: Negative.    Respiratory: Positive for cough (productive, yellow/clear sputum) and wheezing. Negative for shortness of breath.    Cardiovascular: Positive for chest pain (with deep inhalation).   Gastrointestinal: Negative for abdominal pain, diarrhea, nausea and vomiting.   Genitourinary: Negative for dysuria.   Musculoskeletal: Negative for neck pain.   Skin: Negative for rash.   Allergic/Immunologic: Negative.    Neurological: Negative for weakness, numbness and headaches.   Hematological:  Negative.    Psychiatric/Behavioral: Negative.    All other systems reviewed and are negative.      PHYSICAL EXAM  ED Triage Vitals [01/06/20 2103]   Temp Heart Rate Resp BP SpO2   100.3 °F (37.9 °C) (!) 122 18 -- 96 %      Temp src Heart Rate Source Patient Position BP Location FiO2 (%)   Tympanic -- -- -- --       Physical Exam   Constitutional: She is oriented to person, place, and time. No distress.   HENT:   Head: Normocephalic and atraumatic.   Eyes: Pupils are equal, round, and reactive to light. EOM are normal.   Neck: Normal range of motion. Neck supple.   Cardiovascular: Normal rate, regular rhythm and normal heart sounds.   Pulmonary/Chest: Effort normal and breath sounds normal. No respiratory distress. She exhibits tenderness.   Abdominal: Soft. There is no tenderness. There is no rebound and no guarding.   Musculoskeletal: Normal range of motion. She exhibits no edema.   Neurological: She is alert and oriented to person, place, and time. She has normal sensation and normal strength.   Skin: Skin is warm and dry. No rash noted.   Psychiatric: Mood and affect normal.   Nursing note and vitals reviewed.      LAB RESULTS  Lab Results (last 24 hours)     Procedure Component Value Units Date/Time    Influenza Antigen, Rapid - Swab, Nasopharynx [[id number]]  (Abnormal) Collected:  01/06/20 2210    Specimen:  Swab from Nasopharynx Updated:  01/06/20 2307     Influenza A Ag, EIA Negative     Influenza B Ag, EIA Positive          I ordered the above labs and reviewed the results    RADIOLOGY  XR Chest 2 View   Preliminary Result   1. No active disease.                   I ordered the above noted radiological studies. Interpreted by radiologist. Reviewed by me in PACS.       PROCEDURES  Procedures        PROGRESS AND CONSULTS  ED Course as of Jan 06 2322   Mon Jan 06, 2020 2310 11:10 PM  Patient here for cough and pleuritic pain.  Has no pneumonia but does have influenza B.  Given albuterol with improvement of  cough.  Outside of Tamiflu window.  Will discharge home.  NSAIDS, albuterol.    [SL]      ED Course User Index  [SL] Roque Pro MD       [id number] ordered rapid influenza test and chest XR for further evaluation. Ordered tylenol for fever and albuterol for cough.     MEDICAL DECISION MAKING  Results were reviewed/discussed with the patient and they were also made aware of online access. Pt also made aware that some labs, such as cultures, will not be resulted during ER visit and follow up with PMD is necessary.     MDM  Number of Diagnoses or Management Options     Amount and/or Complexity of Data Reviewed  Tests in the radiology section of CPT®: reviewed and ordered (Chest XR: 1. No active disease.)  Independent visualization of images, tracings, or specimens: yes           DIAGNOSIS  Final diagnoses:   Chest wall pain   Influenza B       DISPOSITION  DISCHARGE    Patient discharged in stable condition.    Reviewed implications of results, diagnosis, meds, responsibility to follow up, warning signs and symptoms of possible worsening, potential complications and reasons to return to ER, including worsening pain, difficulty breathing.    Patient/Family voiced understanding of above instructions.    Discussed plan for discharge, as there is no emergent indication for admission. Patient referred to primary care provider for BP management due to today's BP. Pt/family is agreeable and understands need for follow up and repeat testing.  Pt is aware that discharge does not mean that nothing is wrong but it indicates no emergency is present that requires admission and they must continue care with follow-up as given below or physician of their choice.     FOLLOW-UP  PATIENT [facility] [id number]  [phone]  Schedule an appointment as soon as possible for a visit            Medication List      New Prescriptions    albuterol sulfate  (90 Base) MCG/ACT inhaler  Commonly known as:  PROVENTIL  HFA;VENTOLIN HFA;PROAIR HFA  Inhale 2 puffs Every 4 (Four) Hours As Needed for Wheezing.     ibuprofen 600 MG tablet  Commonly known as:  ADVIL,MOTRIN  Take 1 tablet by mouth Every 6 (Six) Hours As Needed for Moderate Pain .            Latest Documented Vital Signs:  As of 11:22 PM  BP- 131/70 HR- 107 Temp- 99.9 °F (37.7 °C) O2 sat- 96%    --  Documentation assistance provided by shahla Barragan for Dr. Pro.  Information recorded by the scribe was done at my direction and has been verified and validated by me.              Saumya Barragan  01/06/20 4891       Roque Pro MD  01/06/20 4202

## 2020-01-07 NOTE — ED TRIAGE NOTES
Pt to ED with c/o cough, SOA x 1 week.  Pt reports being exposed to her bf with similar symptoms.  Pt reports fever the last 3 days.